# Patient Record
Sex: FEMALE | Race: WHITE | URBAN - METROPOLITAN AREA
[De-identification: names, ages, dates, MRNs, and addresses within clinical notes are randomized per-mention and may not be internally consistent; named-entity substitution may affect disease eponyms.]

---

## 2021-05-13 ENCOUNTER — APPOINTMENT (OUTPATIENT)
Dept: URBAN - METROPOLITAN AREA SURGERY 9 | Age: 46
Setting detail: DERMATOLOGY
End: 2021-05-14

## 2021-05-13 DIAGNOSIS — I78.8 OTHER DISEASES OF CAPILLARIES: ICD-10-CM

## 2021-05-13 PROCEDURE — OTHER COSMETIC CONSULTATION: RED SPOTS: OTHER

## 2021-05-13 PROCEDURE — OTHER COSMETIC CONSULTATION - RED SPOTS: OTHER

## 2021-05-13 PROCEDURE — OTHER OTHER: OTHER

## 2021-05-13 ASSESSMENT — LOCATION ZONE DERM
LOCATION ZONE: FACE
LOCATION ZONE: NOSE

## 2021-05-13 ASSESSMENT — LOCATION SIMPLE DESCRIPTION DERM
LOCATION SIMPLE: RIGHT CHEEK
LOCATION SIMPLE: LEFT CHEEK
LOCATION SIMPLE: NOSE

## 2021-05-13 ASSESSMENT — LOCATION DETAILED DESCRIPTION DERM
LOCATION DETAILED: NASAL SUPRATIP
LOCATION DETAILED: LEFT CENTRAL MALAR CHEEK
LOCATION DETAILED: RIGHT MEDIAL MALAR CHEEK

## 2021-05-13 NOTE — PROCEDURE: OTHER
Detail Level: Simple
Render Risk Assessment In Note?: no
Note Text (......Xxx Chief Complaint.): This diagnosis correlates with the
Other (Free Text): Quoted cosmetic fee of $250 per treatment for telangiectasias mostly on/around the nose and few isolated ones on the cheeks.  Average 3-4 txs.

## 2021-06-28 ENCOUNTER — APPOINTMENT (OUTPATIENT)
Dept: URBAN - METROPOLITAN AREA SURGERY 9 | Age: 46
Setting detail: DERMATOLOGY
End: 2021-06-28

## 2021-06-28 DIAGNOSIS — I78.8 OTHER DISEASES OF CAPILLARIES: ICD-10-CM

## 2021-06-28 PROCEDURE — OTHER OTHER (COSMETIC): OTHER

## 2021-06-28 PROCEDURE — OTHER EXCEL V LASER: OTHER

## 2021-06-28 ASSESSMENT — LOCATION SIMPLE DESCRIPTION DERM
LOCATION SIMPLE: NOSE
LOCATION SIMPLE: RIGHT CHEEK
LOCATION SIMPLE: LEFT CHEEK

## 2021-06-28 ASSESSMENT — LOCATION DETAILED DESCRIPTION DERM
LOCATION DETAILED: RIGHT MEDIAL MALAR CHEEK
LOCATION DETAILED: LEFT INFERIOR MEDIAL MALAR CHEEK
LOCATION DETAILED: NASAL SUPRATIP

## 2021-06-28 ASSESSMENT — LOCATION ZONE DERM
LOCATION ZONE: FACE
LOCATION ZONE: NOSE

## 2021-06-28 NOTE — HPI: COSMETIC (LASER RED SPOTS)
Have You Had Red Spots Treated With Laser Before?: has not had previous treatments
Additional History: Treatment #1.

## 2021-06-28 NOTE — PROCEDURE: EXCEL V LASER
Price (Use Numbers Only, No Special Characters Or $): 534 Price (Use Numbers Only, No Special Characters Or $): 861

## 2023-03-23 NOTE — PROCEDURE: OTHER (COSMETIC)
Detail Level: Zone
Other (Free Text): Treatment #1. Cosmetic fee of $250.
Soolantra Pregnancy And Lactation Text: This medication is Pregnancy Category C. This medication is considered safe during breast feeding.